# Patient Record
Sex: MALE | Race: WHITE | NOT HISPANIC OR LATINO
[De-identification: names, ages, dates, MRNs, and addresses within clinical notes are randomized per-mention and may not be internally consistent; named-entity substitution may affect disease eponyms.]

---

## 2021-05-03 ENCOUNTER — APPOINTMENT (OUTPATIENT)
Dept: PULMONOLOGY | Facility: HOSPITAL | Age: 81
End: 2021-05-03
Payer: MEDICARE

## 2021-05-03 VITALS
HEART RATE: 70 BPM | HEIGHT: 70 IN | WEIGHT: 234 LBS | DIASTOLIC BLOOD PRESSURE: 70 MMHG | BODY MASS INDEX: 33.5 KG/M2 | SYSTOLIC BLOOD PRESSURE: 120 MMHG

## 2021-05-03 DIAGNOSIS — Z78.9 OTHER SPECIFIED HEALTH STATUS: ICD-10-CM

## 2021-05-03 DIAGNOSIS — I48.91 UNSPECIFIED ATRIAL FIBRILLATION: ICD-10-CM

## 2021-05-03 PROBLEM — Z00.00 ENCOUNTER FOR PREVENTIVE HEALTH EXAMINATION: Status: ACTIVE | Noted: 2021-05-03

## 2021-05-03 PROCEDURE — 99214 OFFICE O/P EST MOD 30 MIN: CPT

## 2021-05-03 RX ORDER — CLOPIDOGREL 75 MG/1
TABLET, FILM COATED ORAL
Refills: 0 | Status: ACTIVE | COMMUNITY

## 2021-05-03 RX ORDER — TORSEMIDE 5 MG/1
TABLET ORAL
Refills: 0 | Status: ACTIVE | COMMUNITY

## 2021-05-03 RX ORDER — ALLOPURINOL 200 MG/1
TABLET ORAL
Refills: 0 | Status: ACTIVE | COMMUNITY

## 2021-05-03 RX ORDER — METOPROLOL SUCCINATE 100 MG/1
TABLET, EXTENDED RELEASE ORAL
Refills: 0 | Status: ACTIVE | COMMUNITY

## 2021-05-03 RX ORDER — APIXABAN 5 MG/1
TABLET, FILM COATED ORAL
Refills: 0 | Status: ACTIVE | COMMUNITY

## 2021-05-03 RX ORDER — SPIRONOLACTONE 50 MG/1
TABLET ORAL
Refills: 0 | Status: ACTIVE | COMMUNITY

## 2021-05-03 RX ORDER — PANTOPRAZOLE SODIUM 20 MG/1
TABLET, DELAYED RELEASE ORAL
Refills: 0 | Status: ACTIVE | COMMUNITY

## 2021-05-03 RX ORDER — ROSUVASTATIN CALCIUM 5 MG/1
TABLET, FILM COATED ORAL
Refills: 0 | Status: ACTIVE | COMMUNITY

## 2021-05-03 RX ORDER — ISOSORBIDE DINITRATE 5 MG/1
TABLET ORAL
Refills: 0 | Status: ACTIVE | COMMUNITY

## 2021-05-03 NOTE — HISTORY OF PRESENT ILLNESS
[FreeTextEntry1] : His internist is IDA FERGUSON MD, [facility].\par \par 81 years old with history of a fib planned for ablation, kidney stones.  He is a retired .   On BiPAP of 19/15 patient did well.  patient is compliant with the BiPAP and benefitted with the BIPAP.\par Average usage is 8 hours a night, calculated AHI is 3 . \par \par Mr. Velazco is obese and has erratic sleep and daytime tiredness.  \par \par Mr. Velazco had a sleep study in 2012  found to have severe sleep apnea with AHI of 87. \par \par uses fullface mask\par dme apria\par complaining of humidity issues (dryness and raining out in tube)

## 2021-05-03 NOTE — ASSESSMENT
[FreeTextEntry1] : 81 year  old man  with sleep apnea is doing well with the CPAP.  Patient is compliant with the CPAP and benefited significantly with the CPAP.\par \par Will send an order for a new cpap for the patient as his cpap is old.\par \par He had  questions about rem behaviour disorder, dryness which we discussed about today.\par

## 2021-05-03 NOTE — PHYSICAL EXAM
[General Appearance - Well Developed] : well developed [Well Groomed] : well groomed [IV] : IV [Irregularly Irregular] : the rhythm was irregularly irregular [] : no respiratory distress [Auscultation Breath Sounds / Voice Sounds] : lungs were clear to auscultation bilaterally [No Focal Deficits] : no focal deficits [FreeTextEntry1] : no edema

## 2021-05-26 ENCOUNTER — APPOINTMENT (OUTPATIENT)
Dept: PULMONOLOGY | Facility: CLINIC | Age: 81
End: 2021-05-26
Payer: MEDICARE

## 2021-05-26 VITALS
WEIGHT: 234 LBS | HEIGHT: 70 IN | OXYGEN SATURATION: 95 % | HEART RATE: 72 BPM | DIASTOLIC BLOOD PRESSURE: 68 MMHG | TEMPERATURE: 97.3 F | BODY MASS INDEX: 33.5 KG/M2 | SYSTOLIC BLOOD PRESSURE: 112 MMHG

## 2021-05-26 PROCEDURE — 99072 ADDL SUPL MATRL&STAF TM PHE: CPT

## 2021-05-26 PROCEDURE — 99213 OFFICE O/P EST LOW 20 MIN: CPT

## 2021-05-26 RX ORDER — TIOTROPIUM BROMIDE 18 UG/1
CAPSULE ORAL; RESPIRATORY (INHALATION)
Refills: 0 | Status: ACTIVE | COMMUNITY

## 2021-05-26 RX ORDER — DILTIAZEM HYDROCHLORIDE 60 MG/1
TABLET, COATED ORAL
Refills: 0 | Status: DISCONTINUED | COMMUNITY
End: 2021-05-26

## 2021-05-26 NOTE — ASSESSMENT
[FreeTextEntry1] : 81 year  old man  with sleep apnea is doing well with the CPAP.  Patient is compliant with the CPAP and benefited significantly with the CPAP.\par

## 2021-05-26 NOTE — PHYSICAL EXAM
[General Appearance - Well Developed] : well developed [General Appearance - Well Nourished] : well nourished [Heart Sounds] : normal S1 and S2 [Murmurs] : no murmurs [] : no respiratory distress [Auscultation Breath Sounds / Voice Sounds] : lungs were clear to auscultation bilaterally [Abdomen Soft] : soft [Abdomen Tenderness] : non-tender [No Focal Deficits] : no focal deficits [Oriented To Time, Place, And Person] : oriented to person, place, and time [Memory Recent] : recent memory was not impaired [III] : III [FreeTextEntry1] : no edema

## 2021-05-26 NOTE — HISTORY OF PRESENT ILLNESS
[FreeTextEntry1] : His internist is IDA FERGUSON MD, [facility].\par Dr. Person\par Dr. Medina\par \par 81 years old with history of a fib s/p ablation, kidney stones.  He is a retired .   On BiPAP of 19/15 patient did well.  patient is compliant with the BiPAP and benefitted with the BIPAP.\par Average usage is 7 hours a night, calculated AHI is 1.9 . \par \par Mr. Velazco had a sleep study in 2012  found to have severe sleep apnea with AHI of 87. \par \par uses fullface mask\par dme apria\par \par He is complaining of shortness of breath at rest since the ablation, I have checked his bipap machine today and I assured him that the shortness of breath is not related to the sleep apnea or the bipap machine.\par \par He has an appointment with Dr. Person to address the shortness of breath.\par \par Reviewed the echo done on 5/20/2021 - there is nothing to point the shortness of breath he is describing. Left and rt atrium dilated (but that shouldn’t explain the shortness of breath he is describing). No pulmonary hypertension based on the echo.

## 2021-09-29 ENCOUNTER — APPOINTMENT (OUTPATIENT)
Dept: PULMONOLOGY | Facility: CLINIC | Age: 81
End: 2021-09-29
Payer: MEDICARE

## 2021-09-29 VITALS — WEIGHT: 223 LBS | BODY MASS INDEX: 31.92 KG/M2 | HEIGHT: 70 IN

## 2021-09-29 VITALS
DIASTOLIC BLOOD PRESSURE: 80 MMHG | SYSTOLIC BLOOD PRESSURE: 106 MMHG | RESPIRATION RATE: 16 BRPM | TEMPERATURE: 96.9 F | OXYGEN SATURATION: 96 % | HEART RATE: 64 BPM

## 2021-09-29 PROCEDURE — 99213 OFFICE O/P EST LOW 20 MIN: CPT

## 2021-09-29 NOTE — HISTORY OF PRESENT ILLNESS
[FreeTextEntry1] : His internist is IDA FERGUSON MD, [facility].\par Dr. Person\par Dr. Medina\par \par 81 years old with history of a fib s/p ablation, kidney stones.  He is a retired .   On BiPAP of 19/15 patient did well.  patient is compliant with the BiPAP and benefitted with the BIPAP.\par Average usage is 7 hours a night, calculated AHI is 0.5, will decrease pressure to 18/14 . \par \par Mr. Velazco had a sleep study in 2012  found to have severe sleep apnea with AHI of 87. \par \par uses fullface mask\par dme apria

## 2021-09-29 NOTE — ASSESSMENT
[FreeTextEntry1] : 81 year  old man  with sleep apnea is doing well with the CPAP.  Patient is compliant with the CPAP and benefited significantly with the CPAP.\par Will decrease pressure slightly to 18/14 cm as he is doing well with ahi and there is some leak at the mask.

## 2022-04-05 ENCOUNTER — APPOINTMENT (OUTPATIENT)
Dept: PULMONOLOGY | Facility: CLINIC | Age: 82
End: 2022-04-05
Payer: MEDICARE

## 2022-04-05 VITALS
SYSTOLIC BLOOD PRESSURE: 106 MMHG | HEART RATE: 64 BPM | WEIGHT: 231 LBS | HEIGHT: 70 IN | BODY MASS INDEX: 33.07 KG/M2 | DIASTOLIC BLOOD PRESSURE: 80 MMHG

## 2022-04-05 PROCEDURE — 99213 OFFICE O/P EST LOW 20 MIN: CPT

## 2022-04-05 NOTE — HISTORY OF PRESENT ILLNESS
[FreeTextEntry1] : His internist is IDA FERGUSON MD.\par Dr. Person\par Dr. Medina\par \par 82 years old with history of a fib s/p ablation has recurrent a fib episodes and is seeing EP, kidney stones, neuropathy uses cane.  He is a retired .   On BiPAP of 18/14 patient did well.  patient is compliant with the BiPAP and benefitted with the BIPAP.\par Average usage is 7 hours a night, calculated AHI is 0.6, which is good control of sleep apnea . \par \par Mr. Velazco had a sleep study in 2012  found to have severe sleep apnea with AHI of 87. \par \par uses fullface mask\par dme apria\par \par Patient also is complaining of significant insomnia since the metoprolol is increased.

## 2022-04-05 NOTE — ASSESSMENT
[FreeTextEntry1] : 82 year  old man  with sleep apnea is doing well with the BiPAP.  Patient is compliant with the BiPAP and benefited significantly with the BiPAP.\par Current settings on the BIPAP is controlling the apnea well.

## 2022-07-20 ENCOUNTER — APPOINTMENT (OUTPATIENT)
Dept: PULMONOLOGY | Facility: CLINIC | Age: 82
End: 2022-07-20

## 2022-07-20 VITALS
SYSTOLIC BLOOD PRESSURE: 110 MMHG | HEIGHT: 70 IN | DIASTOLIC BLOOD PRESSURE: 62 MMHG | WEIGHT: 230 LBS | RESPIRATION RATE: 20 BRPM | TEMPERATURE: 97.7 F | HEART RATE: 82 BPM | BODY MASS INDEX: 32.93 KG/M2 | OXYGEN SATURATION: 95 %

## 2022-07-20 DIAGNOSIS — F51.04 PSYCHOPHYSIOLOGIC INSOMNIA: ICD-10-CM

## 2022-07-20 PROCEDURE — 99214 OFFICE O/P EST MOD 30 MIN: CPT

## 2022-07-20 NOTE — ASSESSMENT
[FreeTextEntry1] : 82 year  old man  with sleep apnea is doing well with the BiPAP.  Patient is compliant with the BiPAP and benefited significantly with the BiPAP.\par Current settings on the BIPAP is controlling the apnea well. \par \par His insomnia has gotten worse over time - he will try ambien 2.5 mg in the middle of the night, I asked him to use it only 3 times a week to limit side effects.

## 2022-09-21 ENCOUNTER — APPOINTMENT (OUTPATIENT)
Dept: PULMONOLOGY | Facility: CLINIC | Age: 82
End: 2022-09-21

## 2022-09-21 VITALS
DIASTOLIC BLOOD PRESSURE: 54 MMHG | OXYGEN SATURATION: 95 % | TEMPERATURE: 97.6 F | SYSTOLIC BLOOD PRESSURE: 108 MMHG | WEIGHT: 230 LBS | RESPIRATION RATE: 20 BRPM | HEIGHT: 70 IN | BODY MASS INDEX: 32.93 KG/M2 | HEART RATE: 68 BPM

## 2022-09-21 PROCEDURE — 99213 OFFICE O/P EST LOW 20 MIN: CPT

## 2022-09-21 RX ORDER — RAMELTEON 8 MG/1
8 TABLET ORAL
Qty: 30 | Refills: 0 | Status: DISCONTINUED | COMMUNITY
Start: 2022-04-05 | End: 2022-09-21

## 2022-09-21 RX ORDER — ZOLPIDEM TARTRATE 5 MG/1
5 TABLET ORAL
Qty: 30 | Refills: 0 | Status: DISCONTINUED | COMMUNITY
Start: 2022-07-20 | End: 2022-09-21

## 2022-09-21 NOTE — HISTORY OF PRESENT ILLNESS
[FreeTextEntry1] : His internist is IDA FERGUSON MD.\par Dr. Person\par Dr. Medina\par \par 82 year old with history of a fib s/p ablation, kidney stones, neuropathy uses cane.  He is a retired .   On BiPAP of 18/14 patient did well.  patient is compliant with the BiPAP and benefitted with the BIPAP.\par Average usage is 6 hours a night, calculated AHI is 1, which is good control of sleep apnea . \par \par Mr. Velazco had a sleep study in 2012  found to have severe sleep apnea with AHI of 87. \par \par uses fullface mask\par dme apria\par uses resmed machine\par \par stopped using ambien and is doing well with insomnia.

## 2022-09-21 NOTE — ASSESSMENT
[FreeTextEntry1] : 82 year  old man  with sleep apnea is doing well with the BiPAP.  Patient is compliant with the BiPAP and benefited significantly with the BiPAP.\par Current settings on the BIPAP is controlling the apnea well. \par

## 2022-12-01 ENCOUNTER — TRANSCRIPTION ENCOUNTER (OUTPATIENT)
Age: 82
End: 2022-12-01

## 2023-01-19 ENCOUNTER — TRANSCRIPTION ENCOUNTER (OUTPATIENT)
Age: 83
End: 2023-01-19

## 2023-02-17 ENCOUNTER — TRANSCRIPTION ENCOUNTER (OUTPATIENT)
Age: 83
End: 2023-02-17

## 2023-02-20 ENCOUNTER — TRANSCRIPTION ENCOUNTER (OUTPATIENT)
Age: 83
End: 2023-02-20

## 2023-03-07 ENCOUNTER — TRANSCRIPTION ENCOUNTER (OUTPATIENT)
Age: 83
End: 2023-03-07

## 2023-03-08 ENCOUNTER — TRANSCRIPTION ENCOUNTER (OUTPATIENT)
Age: 83
End: 2023-03-08

## 2023-09-18 ENCOUNTER — APPOINTMENT (OUTPATIENT)
Dept: PULMONOLOGY | Facility: CLINIC | Age: 83
End: 2023-09-18
Payer: MEDICARE

## 2023-09-18 VITALS
BODY MASS INDEX: 32.93 KG/M2 | DIASTOLIC BLOOD PRESSURE: 60 MMHG | WEIGHT: 230 LBS | HEART RATE: 65 BPM | HEIGHT: 70 IN | SYSTOLIC BLOOD PRESSURE: 108 MMHG

## 2023-09-18 VITALS — OXYGEN SATURATION: 95 %

## 2023-09-18 DIAGNOSIS — G47.33 OBSTRUCTIVE SLEEP APNEA (ADULT) (PEDIATRIC): ICD-10-CM

## 2023-09-18 PROCEDURE — 99213 OFFICE O/P EST LOW 20 MIN: CPT

## 2024-01-30 ENCOUNTER — TRANSCRIPTION ENCOUNTER (OUTPATIENT)
Age: 84
End: 2024-01-30

## 2024-05-15 ENCOUNTER — RESULT REVIEW (OUTPATIENT)
Age: 84
End: 2024-05-15

## 2024-07-17 ENCOUNTER — APPOINTMENT (OUTPATIENT)
Dept: PULMONOLOGY | Facility: CLINIC | Age: 84
End: 2024-07-17
Payer: MEDICARE

## 2024-07-17 VITALS
HEIGHT: 70 IN | OXYGEN SATURATION: 94 % | HEART RATE: 88 BPM | WEIGHT: 230 LBS | RESPIRATION RATE: 17 BRPM | SYSTOLIC BLOOD PRESSURE: 114 MMHG | DIASTOLIC BLOOD PRESSURE: 66 MMHG | BODY MASS INDEX: 32.93 KG/M2

## 2024-07-17 DIAGNOSIS — G47.33 OBSTRUCTIVE SLEEP APNEA (ADULT) (PEDIATRIC): ICD-10-CM

## 2024-07-17 PROCEDURE — 99213 OFFICE O/P EST LOW 20 MIN: CPT

## 2024-07-17 RX ORDER — ALCLOMETASONE DIPROPIONATE 0.5 MG/G
0.05 CREAM TOPICAL
Refills: 0 | Status: ACTIVE | COMMUNITY

## 2024-07-17 RX ORDER — ROSUVASTATIN CALCIUM 40 MG/1
40 TABLET, FILM COATED ORAL
Refills: 0 | Status: ACTIVE | COMMUNITY

## 2024-07-17 RX ORDER — GABAPENTIN 100 MG/1
100 CAPSULE ORAL
Qty: 30 | Refills: 0 | Status: ACTIVE | COMMUNITY
Start: 2024-07-17 | End: 1900-01-01

## 2024-09-30 ENCOUNTER — APPOINTMENT (OUTPATIENT)
Dept: PULMONOLOGY | Facility: CLINIC | Age: 84
End: 2024-09-30
Payer: MEDICARE

## 2024-09-30 VITALS
HEART RATE: 85 BPM | WEIGHT: 230 LBS | SYSTOLIC BLOOD PRESSURE: 115 MMHG | HEIGHT: 70 IN | DIASTOLIC BLOOD PRESSURE: 65 MMHG | BODY MASS INDEX: 32.93 KG/M2

## 2024-09-30 DIAGNOSIS — F51.04 PSYCHOPHYSIOLOGIC INSOMNIA: ICD-10-CM

## 2024-09-30 DIAGNOSIS — G47.33 OBSTRUCTIVE SLEEP APNEA (ADULT) (PEDIATRIC): ICD-10-CM

## 2024-09-30 PROCEDURE — 99213 OFFICE O/P EST LOW 20 MIN: CPT

## 2024-09-30 NOTE — ASSESSMENT
[FreeTextEntry1] : Mr. Velazco has made significant progress in managing his sleep apnea with the use of the BiPAP and the new mask. The reduction in AHI to 2.8 demonstrates effective therapy and good compliance.  Plan:  Continue BiPAP Therapy: Reinforce the importance of nightly use and proper mask fitting. Follow-Up: Schedule a follow-up appointment  to reassess symptoms and therapy effectiveness. Patient Education: Discussed ongoing management of sleep apnea and encourage communication regarding any further issues with the mask or therapy. Monitor Symptoms: Advise Mr. Velazco to report any return of symptoms such as excessive daytime sleepiness or any difficulties with mask usage. He is compliant and benefitted with BIpap

## 2024-09-30 NOTE — HISTORY OF PRESENT ILLNESS
[FreeTextEntry1] : His internist is IDA FERGUSON MD. Dr. Person 84 year old with history of a fib s/p ablation, kidney stones, neuropathy uses cane, s.p pacemaker.  He is a retired .   On BiPAP of 18/14 patient did well.  patient is having trouble with his BIPAP, he has mask leak issues every day and taking out the mask in the middle of the night. This has finally resolved with mohan and paykel josesito fullface mask s-m. Average usage is 6 hours a night, calculated AHI is 2.8, which is good control of sleep apnea .    Mr. Velazco had a sleep study in 2012  found to have severe sleep apnea with AHI of 87.

## 2025-01-14 ENCOUNTER — NON-APPOINTMENT (OUTPATIENT)
Age: 85
End: 2025-01-14

## 2025-04-16 ENCOUNTER — NON-APPOINTMENT (OUTPATIENT)
Age: 85
End: 2025-04-16